# Patient Record
Sex: MALE | Race: BLACK OR AFRICAN AMERICAN | Employment: UNEMPLOYED | URBAN - METROPOLITAN AREA
[De-identification: names, ages, dates, MRNs, and addresses within clinical notes are randomized per-mention and may not be internally consistent; named-entity substitution may affect disease eponyms.]

---
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
## 2022-03-19 PROBLEM — M62.82 RHABDOMYOLYSIS: Status: ACTIVE | Noted: 2020-12-12

## 2022-03-19 PROBLEM — M62.830 SPASM OF BACK MUSCLES: Status: ACTIVE | Noted: 2020-12-12

## 2022-03-19 PROBLEM — R10.84 GENERALIZED ABDOMINAL PAIN: Status: ACTIVE | Noted: 2020-12-12

## 2024-09-12 PROBLEM — G40.901 STATUS EPILEPTICUS (HCC): Status: ACTIVE | Noted: 2024-09-12

## 2024-09-15 PROBLEM — N17.9 ARF (ACUTE RENAL FAILURE) (HCC): Status: ACTIVE | Noted: 2024-09-15

## 2024-11-23 ENCOUNTER — HOSPITAL ENCOUNTER (EMERGENCY)
Facility: HOSPITAL | Age: 24
Discharge: HOME | End: 2024-11-23
Attending: EMERGENCY MEDICINE
Payer: MEDICAID

## 2024-11-23 VITALS
HEART RATE: 73 BPM | RESPIRATION RATE: 18 BRPM | DIASTOLIC BLOOD PRESSURE: 73 MMHG | HEIGHT: 74 IN | SYSTOLIC BLOOD PRESSURE: 125 MMHG | TEMPERATURE: 98.9 F | BODY MASS INDEX: 19.25 KG/M2 | OXYGEN SATURATION: 97 % | WEIGHT: 150 LBS

## 2024-11-23 DIAGNOSIS — R56.9 SEIZURE (HCC): Primary | ICD-10-CM

## 2024-11-23 LAB
ALBUMIN SERPL-MCNC: 3.6 G/DL (ref 3.4–5)
ALBUMIN/GLOB SERPL: 1 (ref 0.8–1.7)
ALP SERPL-CCNC: 78 U/L (ref 45–117)
ALT SERPL-CCNC: 28 U/L (ref 16–61)
AMPHET UR QL SCN: NEGATIVE
ANION GAP SERPL CALC-SCNC: 2 MMOL/L (ref 3–18)
APPEARANCE UR: CLEAR
AST SERPL-CCNC: 22 U/L (ref 10–38)
BACTERIA URNS QL MICRO: ABNORMAL /HPF
BARBITURATES UR QL SCN: NEGATIVE
BASOPHILS # BLD: 0.1 K/UL (ref 0–0.1)
BASOPHILS NFR BLD: 1 % (ref 0–2)
BENZODIAZ UR QL: NEGATIVE
BILIRUB SERPL-MCNC: 0.3 MG/DL (ref 0.2–1)
BILIRUB UR QL: NEGATIVE
BUN SERPL-MCNC: 15 MG/DL (ref 7–18)
BUN/CREAT SERPL: 13 (ref 12–20)
CALCIUM SERPL-MCNC: 8.7 MG/DL (ref 8.5–10.1)
CANNABINOIDS UR QL SCN: POSITIVE
CHLORIDE SERPL-SCNC: 107 MMOL/L (ref 100–111)
CO2 SERPL-SCNC: 31 MMOL/L (ref 21–32)
COCAINE UR QL SCN: NEGATIVE
COLOR UR: YELLOW
CREAT SERPL-MCNC: 1.19 MG/DL (ref 0.6–1.3)
DIFFERENTIAL METHOD BLD: NORMAL
EOSINOPHIL # BLD: 0.1 K/UL (ref 0–0.4)
EOSINOPHIL NFR BLD: 2 % (ref 0–5)
EPITH CASTS URNS QL MICRO: ABNORMAL /LPF (ref 0–5)
ERYTHROCYTE [DISTWIDTH] IN BLOOD BY AUTOMATED COUNT: 12.6 % (ref 11.6–14.5)
ETHANOL SERPL-MCNC: <3 MG/DL (ref 0–3)
GLOBULIN SER CALC-MCNC: 3.5 G/DL (ref 2–4)
GLUCOSE SERPL-MCNC: 91 MG/DL (ref 74–99)
GLUCOSE UR STRIP.AUTO-MCNC: NEGATIVE MG/DL
HCT VFR BLD AUTO: 40.1 % (ref 36–48)
HGB BLD-MCNC: 13.1 G/DL (ref 13–16)
HGB UR QL STRIP: NEGATIVE
IMM GRANULOCYTES # BLD AUTO: 0 K/UL (ref 0–0.04)
IMM GRANULOCYTES NFR BLD AUTO: 0 % (ref 0–0.5)
KETONES UR QL STRIP.AUTO: NEGATIVE MG/DL
LEUKOCYTE ESTERASE UR QL STRIP.AUTO: NEGATIVE
LYMPHOCYTES # BLD: 3.2 K/UL (ref 0.9–3.6)
LYMPHOCYTES NFR BLD: 48 % (ref 21–52)
Lab: ABNORMAL
MAGNESIUM SERPL-MCNC: 1.9 MG/DL (ref 1.6–2.6)
MCH RBC QN AUTO: 29.4 PG (ref 24–34)
MCHC RBC AUTO-ENTMCNC: 32.7 G/DL (ref 31–37)
MCV RBC AUTO: 90.1 FL (ref 78–100)
METHADONE UR QL: NEGATIVE
MONOCYTES # BLD: 0.6 K/UL (ref 0.05–1.2)
MONOCYTES NFR BLD: 9 % (ref 3–10)
NEUTS SEG # BLD: 2.7 K/UL (ref 1.8–8)
NEUTS SEG NFR BLD: 40 % (ref 40–73)
NITRITE UR QL STRIP.AUTO: POSITIVE
NRBC # BLD: 0 K/UL (ref 0–0.01)
NRBC BLD-RTO: 0 PER 100 WBC
OPIATES UR QL: NEGATIVE
PCP UR QL: NEGATIVE
PH UR STRIP: 7.5 (ref 5–8)
PLATELET # BLD AUTO: 187 K/UL (ref 135–420)
PMV BLD AUTO: 9.2 FL (ref 9.2–11.8)
POTASSIUM SERPL-SCNC: 4.3 MMOL/L (ref 3.5–5.5)
PROT SERPL-MCNC: 7.1 G/DL (ref 6.4–8.2)
PROT UR STRIP-MCNC: NEGATIVE MG/DL
RBC # BLD AUTO: 4.45 M/UL (ref 4.35–5.65)
RBC #/AREA URNS HPF: ABNORMAL /HPF (ref 0–5)
SODIUM SERPL-SCNC: 140 MMOL/L (ref 136–145)
SP GR UR REFRACTOMETRY: <1.005 (ref 1–1.04)
UROBILINOGEN UR QL STRIP.AUTO: 0.2 EU/DL (ref 0.2–1)
VALPROATE SERPL-MCNC: 90 UG/ML (ref 50–100)
WBC # BLD AUTO: 6.7 K/UL (ref 4.6–13.2)
WBC URNS QL MICRO: ABNORMAL /HPF (ref 0–5)

## 2024-11-23 PROCEDURE — 83735 ASSAY OF MAGNESIUM: CPT

## 2024-11-23 PROCEDURE — 82077 ASSAY SPEC XCP UR&BREATH IA: CPT

## 2024-11-23 PROCEDURE — 80177 DRUG SCRN QUAN LEVETIRACETAM: CPT

## 2024-11-23 PROCEDURE — 85025 COMPLETE CBC W/AUTO DIFF WBC: CPT

## 2024-11-23 PROCEDURE — 80053 COMPREHEN METABOLIC PANEL: CPT

## 2024-11-23 PROCEDURE — 99284 EMERGENCY DEPT VISIT MOD MDM: CPT

## 2024-11-23 PROCEDURE — 96374 THER/PROPH/DIAG INJ IV PUSH: CPT

## 2024-11-23 PROCEDURE — 80307 DRUG TEST PRSMV CHEM ANLYZR: CPT

## 2024-11-23 PROCEDURE — 6360000002 HC RX W HCPCS: Performed by: HEALTH CARE PROVIDER

## 2024-11-23 PROCEDURE — 2580000003 HC RX 258: Performed by: EMERGENCY MEDICINE

## 2024-11-23 PROCEDURE — 81001 URINALYSIS AUTO W/SCOPE: CPT

## 2024-11-23 PROCEDURE — 80164 ASSAY DIPROPYLACETIC ACD TOT: CPT

## 2024-11-23 RX ORDER — LEVETIRACETAM 1000 MG/1
1000 TABLET ORAL 2 TIMES DAILY
Qty: 60 TABLET | Refills: 2 | Status: SHIPPED | OUTPATIENT
Start: 2024-11-23 | End: 2025-02-21

## 2024-11-23 RX ORDER — 0.9 % SODIUM CHLORIDE 0.9 %
1000 INTRAVENOUS SOLUTION INTRAVENOUS ONCE
Status: COMPLETED | OUTPATIENT
Start: 2024-11-23 | End: 2024-11-23

## 2024-11-23 RX ORDER — LEVETIRACETAM 500 MG/5ML
2000 INJECTION, SOLUTION, CONCENTRATE INTRAVENOUS
Status: COMPLETED | OUTPATIENT
Start: 2024-11-23 | End: 2024-11-23

## 2024-11-23 RX ADMIN — LEVETIRACETAM 2000 MG: 100 INJECTION INTRAVENOUS at 19:31

## 2024-11-23 RX ADMIN — SODIUM CHLORIDE 1000 ML: 9 INJECTION, SOLUTION INTRAVENOUS at 17:37

## 2024-11-23 ASSESSMENT — ENCOUNTER SYMPTOMS
CHEST TIGHTNESS: 0
COUGH: 0
ABDOMINAL PAIN: 0
SHORTNESS OF BREATH: 0
DIARRHEA: 0
VOMITING: 0
NAUSEA: 0

## 2024-11-23 ASSESSMENT — PAIN - FUNCTIONAL ASSESSMENT
PAIN_FUNCTIONAL_ASSESSMENT: ACTIVITIES ARE NOT PREVENTED
PAIN_FUNCTIONAL_ASSESSMENT: 0-10

## 2024-11-23 ASSESSMENT — PAIN SCALES - GENERAL: PAINLEVEL_OUTOF10: 0

## 2024-11-23 NOTE — ED TRIAGE NOTES
Pt arrived via EMS for reported seizure. Per EMS patient was in the car with his family and had a seizure witnessed by his family.  Pt has a history of seizure per EMS.  Blood sugar 115

## 2024-11-23 NOTE — ED PROVIDER NOTES
EMERGENCY DEPARTMENT HISTORY AND PHYSICAL EXAM        Date: 11/23/2024  Patient Name: Valdemar Figueroa    History of Presenting Illness     Chief Complaint   Patient presents with    Seizures       History Provided By: History obtained from patient    HPI: Valdemar Figueroa, 24 y.o. male presents to the ED with cc of seizure    Patient was in the car with his grandma today when she witnessed a seizure that spontaneously aborted.  She drove him to the emergency department for evaluation.  Patient reports compliance with his seizure medicine in general, he missed his medication dose last night but took it this morning.  But says that he continues to have breakthrough seizure, last seizure was 4 days ago.  Says that he has frequent breakthrough seizures and they usually do not come to the emergency department.  He does not have a neurologist right now due to insurance issues.      No nausea, vomiting, diarrhea, fever, chills, chest pain, shortness of breath, leg swelling     There are no other complaints, changes, or physical findings at this time.    Records Reviewed:   Medication list reviewed, shows keppra 1g bid     PCP: Ondina Baez MD    No current facility-administered medications on file prior to encounter.     Current Outpatient Medications on File Prior to Encounter   Medication Sig Dispense Refill    divalproex (DEPAKOTE ER) 250 MG extended release tablet Take 2 tablets by mouth in the morning and at bedtime 30 tablet 1    sertraline (ZOLOFT) 50 MG tablet Take 1 tablet by mouth daily      amphetamine-dextroamphetamine (ADDERALL) 5 MG tablet Take 15 mg by mouth. (Patient not taking: Reported on 9/12/2024)      levETIRAcetam (KEPPRA) 1000 MG tablet Take 1 tablet by mouth 2 times daily         Past History     Past Medical History:  Past Medical History:   Diagnosis Date    Asthma     Bipolar disorder (HCC)     IBS (irritable bowel syndrome)     Left against medical advice 09/12/2024    Marijuana abuse

## 2024-11-27 LAB — LEVETIRACETAM SERPL-MCNC: <2 UG/ML (ref 10–40)

## 2025-03-06 ENCOUNTER — HOSPITAL ENCOUNTER (OUTPATIENT)
Facility: HOSPITAL | Age: 25
Setting detail: SPECIMEN
Discharge: HOME | End: 2025-03-09

## 2025-03-06 ENCOUNTER — OFFICE VISIT (OUTPATIENT)
Age: 25
End: 2025-03-06
Payer: MEDICAID

## 2025-03-06 VITALS
DIASTOLIC BLOOD PRESSURE: 80 MMHG | RESPIRATION RATE: 18 BRPM | SYSTOLIC BLOOD PRESSURE: 124 MMHG | WEIGHT: 181.4 LBS | HEART RATE: 69 BPM | OXYGEN SATURATION: 98 % | BODY MASS INDEX: 23.28 KG/M2 | TEMPERATURE: 96.8 F | HEIGHT: 74 IN

## 2025-03-06 DIAGNOSIS — G40.909 SEIZURE DISORDER (HCC): Primary | ICD-10-CM

## 2025-03-06 DIAGNOSIS — G40.909 SEIZURE DISORDER (HCC): ICD-10-CM

## 2025-03-06 LAB — LABCORP SPECIMEN COLLECTION: NORMAL

## 2025-03-06 PROCEDURE — 99205 OFFICE O/P NEW HI 60 MIN: CPT | Performed by: STUDENT IN AN ORGANIZED HEALTH CARE EDUCATION/TRAINING PROGRAM

## 2025-03-06 PROCEDURE — 99001 SPECIMEN HANDLING PT-LAB: CPT

## 2025-03-06 RX ORDER — DIVALPROEX SODIUM 500 MG/1
TABLET, FILM COATED, EXTENDED RELEASE ORAL
Qty: 90 TABLET | Refills: 2 | Status: SHIPPED | OUTPATIENT
Start: 2025-03-06

## 2025-03-06 ASSESSMENT — ENCOUNTER SYMPTOMS
SHORTNESS OF BREATH: 0
VOMITING: 0
BACK PAIN: 0
COUGH: 1
NAUSEA: 0

## 2025-03-06 NOTE — PROGRESS NOTES
Valdemar Figueroa is a 24 y.o. male .presents for New Patient  A 24 years old male patient referred here for evaluation of seizure.  He came today with his mother who assisted with the history.  Patient is currently on Keppra 2000 mg p.o. twice daily and Depakote 500 mg p.o. twice daily.  He has been having seizures since the age of 19.  Seizures are mostly nocturnal.  Mother described generalized tonic-clonic seizure; foaming from his mouth; might bite his tongue.  No incontinence.  Might last for about 1 minute.  Postictally, gets confused for about 1 minute.  Occasionally, wakes up on the floor.  He currently has 1-2 generalized tonic-clonic seizure per month.  Also described another spell where he freezes, might fall, does not respond; little bit of shaking.  He currently has these spells 2 times a day.  Mother showed me videos of the 2 types of seizures.  During the generalized tonic-clonic seizure, patient was on the side, arms stretched out, appears stiff, had tonic-clonic jerking.  Was not responding.  During the other spell, patient appears confused, mother guiding him to the ground, not responding to her.  Last spell was today outside of the clinic.  Patient was admitted to [facility] in September 2024.  He had CT scan of the brain which was unremarkable. Had an EEG monitoring for 24 hours: intermittent frequent rhythmic delta activity, mainly in the frontal leads.   Patient used to follow with neurology previously; mother does not  remember the place; possibly has CHKD.  Was reported that he had possible nonepileptic seizures.  Patient was seen at [facility] emergency room in November 2024 for seizure.  Keppra level was undetectable; Depakote level was in therapeutic range.  Mother states that patient is taking his medications properly.      Review of Systems   Constitutional:  Negative for chills, fever and unexpected weight change.   HENT:  Negative for hearing loss and tinnitus.    Eyes:

## 2025-03-07 LAB
LEVETIRACETAM SERPL-MCNC: 32.5 UG/ML (ref 10–40)
VALPROATE SERPL-MCNC: 54 UG/ML (ref 50–100)

## 2025-03-24 ENCOUNTER — TELEPHONE (OUTPATIENT)
Age: 25
End: 2025-03-24

## 2025-03-24 NOTE — TELEPHONE ENCOUNTER
Patient mom called stating they have not heard from anyone at [facility], says he did have a bad seizure yesterday & today .

## 2025-04-10 ENCOUNTER — TELEPHONE (OUTPATIENT)
Age: 25
End: 2025-04-10

## 2025-04-10 NOTE — TELEPHONE ENCOUNTER
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

## 2025-04-24 ENCOUNTER — TELEPHONE (OUTPATIENT)
Age: 25
End: 2025-04-24

## 2025-04-24 NOTE — TELEPHONE ENCOUNTER
Called pcp - they are faxing labs to [phone]   Patient mom called regarding MRI needing peer to peer . Says if she dont answer leave vm

## 2025-04-29 ENCOUNTER — TELEPHONE (OUTPATIENT)
Age: 25
End: 2025-04-29

## 2025-04-29 DIAGNOSIS — E23.7 PITUITARY LESION: Primary | ICD-10-CM

## 2025-04-29 DIAGNOSIS — E23.7 PITUITARY LESION: ICD-10-CM

## 2025-04-29 NOTE — TELEPHONE ENCOUNTER
Patient's mother called in stating patient had grand mal seizure and was waiting for provider to do peer to peer to authorize a second MRI due to a spot being found on his brain. Advised we received a denial but patient's mother wants to know if he did the peer to peer, as per the insurance they would cover it if provider does peer to peer. Please advise.

## 2025-04-29 NOTE — TELEPHONE ENCOUNTER
Spoke to patient's mother and advised that labs need to be completed. She was told that the office will send a copy of the order to their home as she stated it would be hard to get to a [facility] facility to get the lab work completed.

## 2025-05-16 ENCOUNTER — OFFICE VISIT (OUTPATIENT)
Age: 25
End: 2025-05-16

## 2025-05-16 ENCOUNTER — HOSPITAL ENCOUNTER (OUTPATIENT)
Age: 25
Discharge: HOME | End: 2025-05-19

## 2025-05-16 VITALS
HEART RATE: 73 BPM | TEMPERATURE: 97.8 F | SYSTOLIC BLOOD PRESSURE: 96 MMHG | OXYGEN SATURATION: 98 % | RESPIRATION RATE: 18 BRPM | DIASTOLIC BLOOD PRESSURE: 70 MMHG | BODY MASS INDEX: 21.57 KG/M2 | HEIGHT: 74 IN | WEIGHT: 168.1 LBS

## 2025-05-16 DIAGNOSIS — G40.909 SEIZURE DISORDER (HCC): ICD-10-CM

## 2025-05-16 LAB — LABCORP SPECIMEN COLLECTION: NORMAL

## 2025-05-16 RX ORDER — LEVETIRACETAM 1000 MG/1
1000 TABLET ORAL 2 TIMES DAILY
Qty: 60 TABLET | Refills: 2 | Status: SHIPPED | OUTPATIENT
Start: 2025-05-16 | End: 2025-08-14

## 2025-05-16 RX ORDER — QUETIAPINE 50 MG/1
50 TABLET, EXTENDED RELEASE ORAL NIGHTLY
COMMUNITY
Start: 2025-04-14

## 2025-05-16 RX ORDER — DIVALPROEX SODIUM 500 MG/1
TABLET, FILM COATED, EXTENDED RELEASE ORAL
Qty: 90 TABLET | Refills: 2 | Status: SHIPPED | OUTPATIENT
Start: 2025-05-16

## 2025-05-16 ASSESSMENT — ENCOUNTER SYMPTOMS
BACK PAIN: 0
SHORTNESS OF BREATH: 1
DIARRHEA: 1
NAUSEA: 0
VOMITING: 0
COUGH: 0

## 2025-05-16 NOTE — PROGRESS NOTES
Valdemar Figueroa is a 24 y.o. male .presents for Follow-up  A 22 years old male patient here for follow-up of seizure.  Last seen in the clinic in March 2025.  He is on Depakote 500 g a morning and 1000 mg at night and Keppra 1000 mg p.o. twice daily.  He came today with his mother.  His mother mentions that he had a couple of seizures since the last visit; last seizure about 2 days ago.  Mother mentioned back-to-back seizures on May 11, 2025: Spaced out and then started to shake all over; no tongue bite or incontinence.  Had headache postictally.  Was confused.  No Henrry's paralysis.  Taking his medications properly.  Levels of both medications from March were in therapeutic range.  Scheduled for EMU on June 9 at [facility].    Initial encounter:  A 24 years old male patient referred here for evaluation of seizure.  He came today with his mother who assisted with the history.  Patient is currently on Keppra 2000 mg p.o. twice daily and Depakote 500 mg p.o. twice daily.  He has been having seizures since the age of 19.  Seizures are mostly nocturnal.  Mother described generalized tonic-clonic seizure; foaming from his mouth; might bite his tongue.  No incontinence.  Might last for about 1 minute.  Postictally, gets confused for about 1 minute.  Occasionally, wakes up on the floor.  He currently has 1-2 generalized tonic-clonic seizure per month.  Also described another spell where he freezes, might fall, does not respond; little bit of shaking.  He currently has these spells 2 times a day.  Mother showed me videos of the 2 types of seizures.  During the generalized tonic-clonic seizure, patient was on the side, arms stretched out, appears stiff, had tonic-clonic jerking.  Was not responding.  During the other spell, patient appears confused, mother guiding him to the ground, not responding to her.  Last spell was today outside of the clinic.  Patient was admitted to [facility] in September 2024.

## 2025-05-17 LAB
PROLACTIN SERPL-MCNC: 15.5 NG/ML (ref 3.6–31.5)
SPECIMEN STATUS REPORT: NORMAL
TSH SERPL DL<=0.005 MIU/L-ACNC: 1.97 UIU/ML (ref 0.45–4.5)

## 2025-05-18 LAB — ACTH PLAS-MCNC: 38.7 PG/ML (ref 7.2–63.3)

## 2025-05-20 LAB — IGF-I SERPL-MCNC: 167 NG/ML (ref 109–353)

## 2025-06-09 ENCOUNTER — HOSPITAL ENCOUNTER (OUTPATIENT)
Facility: HOSPITAL | Age: 25
Setting detail: OBSERVATION
Discharge: HOME | End: 2025-06-11
Attending: PSYCHIATRY & NEUROLOGY | Admitting: PSYCHIATRY & NEUROLOGY
Payer: MEDICAID

## 2025-06-09 PROBLEM — G40.919 INTRACTABLE SEIZURES (HCC): Status: ACTIVE | Noted: 2025-06-09

## 2025-06-09 LAB
ALBUMIN SERPL-MCNC: 3.3 G/DL (ref 3.5–5)
ALBUMIN/GLOB SERPL: 1.1 (ref 1.1–2.2)
ALP SERPL-CCNC: 56 U/L (ref 45–117)
ALT SERPL-CCNC: 19 U/L (ref 12–78)
AMPHET UR QL SCN: NEGATIVE
ANION GAP SERPL CALC-SCNC: 4 MMOL/L (ref 2–12)
AST SERPL-CCNC: 15 U/L (ref 15–37)
BARBITURATES UR QL SCN: NEGATIVE
BASOPHILS # BLD: 0.03 K/UL (ref 0–0.1)
BASOPHILS NFR BLD: 0.4 % (ref 0–1)
BENZODIAZ UR QL: NEGATIVE
BILIRUB SERPL-MCNC: 0.5 MG/DL (ref 0.2–1)
BUN SERPL-MCNC: 10 MG/DL (ref 6–20)
BUN/CREAT SERPL: 11 (ref 12–20)
CALCIUM SERPL-MCNC: 8.7 MG/DL (ref 8.5–10.1)
CANNABINOIDS UR QL SCN: POSITIVE
CHLORIDE SERPL-SCNC: 108 MMOL/L (ref 97–108)
CO2 SERPL-SCNC: 29 MMOL/L (ref 21–32)
COCAINE UR QL SCN: NEGATIVE
COMMENT:: NORMAL
CREAT SERPL-MCNC: 0.95 MG/DL (ref 0.7–1.3)
DIFFERENTIAL METHOD BLD: ABNORMAL
EOSINOPHIL # BLD: 0.11 K/UL (ref 0–0.4)
EOSINOPHIL NFR BLD: 1.6 % (ref 0–7)
ERYTHROCYTE [DISTWIDTH] IN BLOOD BY AUTOMATED COUNT: 12.6 % (ref 11.5–14.5)
GLOBULIN SER CALC-MCNC: 3.1 G/DL (ref 2–4)
GLUCOSE SERPL-MCNC: 124 MG/DL (ref 65–100)
HCT VFR BLD AUTO: 36.7 % (ref 36.6–50.3)
HGB BLD-MCNC: 12.2 G/DL (ref 12.1–17)
IMM GRANULOCYTES # BLD AUTO: 0.01 K/UL (ref 0–0.04)
IMM GRANULOCYTES NFR BLD AUTO: 0.1 % (ref 0–0.5)
LYMPHOCYTES # BLD: 2.4 K/UL (ref 0.8–3.5)
LYMPHOCYTES NFR BLD: 34.8 % (ref 12–49)
Lab: ABNORMAL
MCH RBC QN AUTO: 30.6 PG (ref 26–34)
MCHC RBC AUTO-ENTMCNC: 33.2 G/DL (ref 30–36.5)
MCV RBC AUTO: 92 FL (ref 80–99)
METHADONE UR QL: NEGATIVE
MONOCYTES # BLD: 0.83 K/UL (ref 0–1)
MONOCYTES NFR BLD: 12 % (ref 5–13)
NEUTS SEG # BLD: 3.52 K/UL (ref 1.8–8)
NEUTS SEG NFR BLD: 51.1 % (ref 32–75)
NRBC # BLD: 0 K/UL (ref 0–0.01)
NRBC BLD-RTO: 0 PER 100 WBC
OPIATES UR QL: NEGATIVE
PCP UR QL: NEGATIVE
PLATELET # BLD AUTO: 171 K/UL (ref 150–400)
PMV BLD AUTO: 9.5 FL (ref 8.9–12.9)
POTASSIUM SERPL-SCNC: 3.6 MMOL/L (ref 3.5–5.1)
PROT SERPL-MCNC: 6.4 G/DL (ref 6.4–8.2)
RBC # BLD AUTO: 3.99 M/UL (ref 4.1–5.7)
SODIUM SERPL-SCNC: 141 MMOL/L (ref 136–145)
SPECIMEN HOLD: NORMAL
SPECIMEN HOLD: NORMAL
VALPROATE SERPL-MCNC: 82 UG/ML (ref 50–100)
WBC # BLD AUTO: 6.9 K/UL (ref 4.1–11.1)

## 2025-06-09 PROCEDURE — 36415 COLL VENOUS BLD VENIPUNCTURE: CPT

## 2025-06-09 PROCEDURE — 99223 1ST HOSP IP/OBS HIGH 75: CPT | Performed by: PSYCHIATRY & NEUROLOGY

## 2025-06-09 PROCEDURE — 85025 COMPLETE CBC W/AUTO DIFF WBC: CPT

## 2025-06-09 PROCEDURE — G0378 HOSPITAL OBSERVATION PER HR: HCPCS

## 2025-06-09 PROCEDURE — 96372 THER/PROPH/DIAG INJ SC/IM: CPT

## 2025-06-09 PROCEDURE — 2500000003 HC RX 250 WO HCPCS: Performed by: PSYCHIATRY & NEUROLOGY

## 2025-06-09 PROCEDURE — 80307 DRUG TEST PRSMV CHEM ANLYZR: CPT

## 2025-06-09 PROCEDURE — 6370000000 HC RX 637 (ALT 250 FOR IP): Performed by: PSYCHIATRY & NEUROLOGY

## 2025-06-09 PROCEDURE — 6360000002 HC RX W HCPCS: Performed by: PSYCHIATRY & NEUROLOGY

## 2025-06-09 PROCEDURE — G0379 DIRECT REFER HOSPITAL OBSERV: HCPCS

## 2025-06-09 PROCEDURE — 80053 COMPREHEN METABOLIC PANEL: CPT

## 2025-06-09 PROCEDURE — 80177 DRUG SCRN QUAN LEVETIRACETAM: CPT

## 2025-06-09 PROCEDURE — 80164 ASSAY DIPROPYLACETIC ACD TOT: CPT

## 2025-06-09 RX ORDER — LORAZEPAM 2 MG/ML
2 INJECTION INTRAMUSCULAR SEE ADMIN INSTRUCTIONS
Status: DISCONTINUED | OUTPATIENT
Start: 2025-06-09 | End: 2025-06-11 | Stop reason: HOSPADM

## 2025-06-09 RX ORDER — SODIUM CHLORIDE 0.9 % (FLUSH) 0.9 %
5-40 SYRINGE (ML) INJECTION EVERY 12 HOURS SCHEDULED
Status: DISCONTINUED | OUTPATIENT
Start: 2025-06-09 | End: 2025-06-11 | Stop reason: HOSPADM

## 2025-06-09 RX ORDER — ONDANSETRON 2 MG/ML
4 INJECTION INTRAMUSCULAR; INTRAVENOUS EVERY 6 HOURS PRN
Status: DISCONTINUED | OUTPATIENT
Start: 2025-06-09 | End: 2025-06-11 | Stop reason: HOSPADM

## 2025-06-09 RX ORDER — LEVETIRACETAM 500 MG/1
1000 TABLET ORAL 2 TIMES DAILY
Status: DISCONTINUED | OUTPATIENT
Start: 2025-06-09 | End: 2025-06-11 | Stop reason: HOSPADM

## 2025-06-09 RX ORDER — QUETIAPINE FUMARATE 50 MG/1
50 TABLET, EXTENDED RELEASE ORAL DAILY
Status: DISCONTINUED | OUTPATIENT
Start: 2025-06-10 | End: 2025-06-10

## 2025-06-09 RX ORDER — ACETAMINOPHEN 650 MG/1
650 SUPPOSITORY RECTAL EVERY 6 HOURS PRN
Status: DISCONTINUED | OUTPATIENT
Start: 2025-06-09 | End: 2025-06-11 | Stop reason: HOSPADM

## 2025-06-09 RX ORDER — ACETAMINOPHEN 325 MG/1
650 TABLET ORAL EVERY 6 HOURS PRN
Status: DISCONTINUED | OUTPATIENT
Start: 2025-06-09 | End: 2025-06-11 | Stop reason: HOSPADM

## 2025-06-09 RX ORDER — ONDANSETRON 4 MG/1
4 TABLET, ORALLY DISINTEGRATING ORAL EVERY 8 HOURS PRN
Status: DISCONTINUED | OUTPATIENT
Start: 2025-06-09 | End: 2025-06-11 | Stop reason: HOSPADM

## 2025-06-09 RX ORDER — SODIUM CHLORIDE 9 MG/ML
INJECTION, SOLUTION INTRAVENOUS PRN
Status: DISCONTINUED | OUTPATIENT
Start: 2025-06-09 | End: 2025-06-11 | Stop reason: HOSPADM

## 2025-06-09 RX ORDER — DIVALPROEX SODIUM 250 MG/1
1000 TABLET, FILM COATED, EXTENDED RELEASE ORAL
Status: DISCONTINUED | OUTPATIENT
Start: 2025-06-09 | End: 2025-06-11

## 2025-06-09 RX ORDER — DIVALPROEX SODIUM 250 MG/1
500 TABLET, FILM COATED, EXTENDED RELEASE ORAL DAILY
Status: DISCONTINUED | OUTPATIENT
Start: 2025-06-10 | End: 2025-06-11

## 2025-06-09 RX ORDER — ENOXAPARIN SODIUM 100 MG/ML
40 INJECTION SUBCUTANEOUS DAILY
Status: DISCONTINUED | OUTPATIENT
Start: 2025-06-09 | End: 2025-06-11 | Stop reason: HOSPADM

## 2025-06-09 RX ORDER — SODIUM CHLORIDE 0.9 % (FLUSH) 0.9 %
5-40 SYRINGE (ML) INJECTION PRN
Status: DISCONTINUED | OUTPATIENT
Start: 2025-06-09 | End: 2025-06-11 | Stop reason: HOSPADM

## 2025-06-09 RX ORDER — POLYETHYLENE GLYCOL 3350 17 G/17G
17 POWDER, FOR SOLUTION ORAL DAILY PRN
Status: DISCONTINUED | OUTPATIENT
Start: 2025-06-09 | End: 2025-06-11 | Stop reason: HOSPADM

## 2025-06-09 RX ADMIN — DIVALPROEX SODIUM 1000 MG: 250 TABLET, FILM COATED, EXTENDED RELEASE ORAL at 22:37

## 2025-06-09 RX ADMIN — ENOXAPARIN SODIUM 40 MG: 100 INJECTION SUBCUTANEOUS at 13:24

## 2025-06-09 RX ADMIN — LEVETIRACETAM 1000 MG: 500 TABLET, FILM COATED ORAL at 22:37

## 2025-06-09 RX ADMIN — SODIUM CHLORIDE, PRESERVATIVE FREE 10 ML: 5 INJECTION INTRAVENOUS at 11:29

## 2025-06-09 RX ADMIN — SODIUM CHLORIDE, PRESERVATIVE FREE 10 ML: 5 INJECTION INTRAVENOUS at 22:40

## 2025-06-09 NOTE — H&P
EMU Admission H&P     NAME: Valdemar Figueroa   :  2000   MRN:  [id number]   DATE:   2025       HPI:  Pt is a 25yo male referred to the EMU by Dr. Razo due to ongoing breakthrough seizures.  Patient is seen with his mother at the bedside who aids greatly in the history.  Patient started having seizures the day after his 19th birthday.  Initially many of them were nocturnal, but it seems they can occur at any time now.  They have difficulty really telling me from start to finish what his seizures like.  He has at least 2 types of spells, the first is staring lasting 30-60 seconds and the second is generalized shaking.  The patient states he may have a warning of hearing a song he has heard before.  He is told his mom's body hurts, he has a pain in his buttocks, and states \"I cannot move.\"  He then will freeze and stare and then starts shaking.  With a big 1 he will moan and shake for 45 to 60 seconds others are shorter 30 to 45 seconds.  He has loss of awareness but no loss of consciousness, he is confused after and may complain of a headache and has bitten his tongue.  He has never had bowel or bladder incontinence.  After a spell he may burp or fart and that when it has ended.  If he is standing he has fallen with them.  He was started on Keppra currently on 1000 mg twice daily and on Depakote /1000mg.  He has seizure risk factor of father and paternal half brother with epilepsy, no history of head injury with LOC, no CNS infection, no history of febrile seizure, mom had a normal pregnancy and delivery with him.  He also routinely smokes marijuana.  There is been a question of nonepileptic spells raised because around the same time he was diagnosed with epilepsy he was diagnosed with bipolar disorder.  He had an abnormal 24-hour EEG 2024 which

## 2025-06-09 NOTE — PROGRESS NOTES
Attempted to deliver and verbally explain the VOON with patient. Patient was with clinical staff. Rosina Connor, Care Management Assistant

## 2025-06-10 PROCEDURE — 6370000000 HC RX 637 (ALT 250 FOR IP): Performed by: PSYCHIATRY & NEUROLOGY

## 2025-06-10 PROCEDURE — 99232 SBSQ HOSP IP/OBS MODERATE 35: CPT | Performed by: PSYCHIATRY & NEUROLOGY

## 2025-06-10 PROCEDURE — 96372 THER/PROPH/DIAG INJ SC/IM: CPT

## 2025-06-10 PROCEDURE — 95716 VEEG EA 12-26HR CONT MNTR: CPT

## 2025-06-10 PROCEDURE — 96374 THER/PROPH/DIAG INJ IV PUSH: CPT

## 2025-06-10 PROCEDURE — 6360000002 HC RX W HCPCS: Performed by: PSYCHIATRY & NEUROLOGY

## 2025-06-10 PROCEDURE — 2500000003 HC RX 250 WO HCPCS: Performed by: PSYCHIATRY & NEUROLOGY

## 2025-06-10 PROCEDURE — G0378 HOSPITAL OBSERVATION PER HR: HCPCS

## 2025-06-10 RX ORDER — QUETIAPINE FUMARATE 50 MG/1
50 TABLET, EXTENDED RELEASE ORAL NIGHTLY
Status: DISCONTINUED | OUTPATIENT
Start: 2025-06-11 | End: 2025-06-11 | Stop reason: HOSPADM

## 2025-06-10 RX ADMIN — QUETIAPINE FUMARATE 50 MG: 50 TABLET, EXTENDED RELEASE ORAL at 09:03

## 2025-06-10 RX ADMIN — ONDANSETRON 4 MG: 2 INJECTION, SOLUTION INTRAMUSCULAR; INTRAVENOUS at 10:45

## 2025-06-10 RX ADMIN — DIVALPROEX SODIUM 500 MG: 250 TABLET, FILM COATED, EXTENDED RELEASE ORAL at 09:19

## 2025-06-10 RX ADMIN — SODIUM CHLORIDE, PRESERVATIVE FREE 10 ML: 5 INJECTION INTRAVENOUS at 09:09

## 2025-06-10 RX ADMIN — ENOXAPARIN SODIUM 40 MG: 100 INJECTION SUBCUTANEOUS at 09:09

## 2025-06-10 RX ADMIN — SODIUM CHLORIDE, PRESERVATIVE FREE 10 ML: 5 INJECTION INTRAVENOUS at 22:56

## 2025-06-10 RX ADMIN — DIVALPROEX SODIUM 1000 MG: 250 TABLET, FILM COATED, EXTENDED RELEASE ORAL at 22:54

## 2025-06-10 RX ADMIN — SERTRALINE HYDROCHLORIDE 50 MG: 50 TABLET ORAL at 09:03

## 2025-06-10 RX ADMIN — LEVETIRACETAM 1000 MG: 500 TABLET, FILM COATED ORAL at 22:54

## 2025-06-10 RX ADMIN — LEVETIRACETAM 1000 MG: 500 TABLET, FILM COATED ORAL at 09:03

## 2025-06-10 NOTE — PROCEDURES
[facility]   EMU REPORT    PROCEDURE:  CONTINUOUS VIDEO EEG MONITORING  NAME:   Valdemar Figueroa  :   2000  ACCOUNT NUMBER : [id number]  MRN:   [id number]  DATE OF SERVICE: 25       HISTORY OF PRESENT ILLNESS:  Briefly, ***     MEDICATIONS:   Current Facility-Administered Medications   Medication Dose Route Frequency Provider Last Rate Last Admin    sodium chloride flush 0.9 % injection 5-40 mL  5-40 mL IntraVENous 2 times per day Mary Rosario MD   10 mL at 06/10/25 0909    sodium chloride flush 0.9 % injection 5-40 mL  5-40 mL IntraVENous PRN Mary Rosario MD        0.9 % sodium chloride infusion   IntraVENous PRN Mary Rosario MD        enoxaparin (LOVENOX) injection 40 mg  40 mg SubCUTAneous Daily Mary Rosario MD   40 mg at 06/10/25 0909    ondansetron (ZOFRAN-ODT) disintegrating tablet 4 mg  4 mg Oral Q8H PRN Mary Rosario MD        Or    ondansetron (ZOFRAN) injection 4 mg  4 mg IntraVENous Q6H PRN Mary Rosario MD        polyethylene glycol (GLYCOLAX) packet 17 g  17 g Oral Daily PRN Mary Rosario MD        acetaminophen (TYLENOL) tablet 650 mg  650 mg Oral Q6H PRN Mary Rosario MD        Or    acetaminophen (TYLENOL) suppository 650 mg  650 mg Rectal Q6H PRN Mary Rosario MD        LORazepam (ATIVAN) injection 2 mg  2 mg IntraVENous See Admin Instructions Mary Rosario MD        LORazepam (ATIVAN) injection 2 mg  2 mg IntraMUSCular See Admin Instructions Mary Rosario MD        fosphenytoin (CEREBYX) 1,525 mg PE in sodium chloride 0.9 % 100 mL IVPB (loading dose)  20 mg PE/kg (Order-Specific) IntraVENous Once PRN Mary Rosario MD        divalproex (DEPAKOTE ER) extended release tablet 500 mg  500 mg Oral Daily Mary Rosario MD   500 mg at 06/10/25 0919    divalproex (DEPAKOTE ER) extended release tablet 1,000 mg  1,000 mg Oral QHS Mary Rosario MD   1,000 mg at 25 [id number]    levETIRAcetam (KEPPRA) tablet 1,000 mg  1,000 mg Oral BID Mabie,

## 2025-06-10 NOTE — PROGRESS NOTES
At approx. 0509 monitor tech alarmed for seizure like activity.     Upon entry monitor tech reported pt making coughing/choking sounds. Pt was uncooperative and stated feeling 'hot and scared.\" RN attempted vitals and LOC. Pt was uncooperative and unable to get BP due to pt moving around in bed. Pt was attempting to grab covers and pull over head.     Pt became mute and staring in distance. Asked pt name and  unable to reply.     This lasted approximately 1-2 minutes.     RN & monitor tech reattempts Name and  and pt is able to respond successfully. Seizure Testing Protocol worksheet is used to assess patient. Pt asked to remember \"football player.\" Pt was able to point both hands to the ceiling. Pt reported feeling 5/10. Pt repeated \"I heard him speak on the radio last night.\" Pt was able to show 2 fingers.pt was unable to remember phrase\" football player.\"    Pt burped and pt's mother reported that is the usual end of seizure like activity. Pt grabbed phone and starting typing.     RN & monitor tech repeated Seizure Testing Protocol worksheet. Pt was able to lift both arms and hold for 5 seconds. When rested back down pt's L arm was shaking when holding phone. Pt stated they were unable to remember anything. Pt's mother asked who the patient was texting at 5 in the morning. Pt replied \"I was not texting anyone.\" RN witnessed a message was sent on phone. Asked pt to repeat the phrase \"I heard him speak on the radio last night.\" Pt replied \"I already said that\". Asked pt to repeat again. Pt was able to repeat all of phrase except \"last night.\" Pt was able to name 2 objects in the room.    This process was completed by approx. 0517.

## 2025-06-11 VITALS
BODY MASS INDEX: 21.2 KG/M2 | SYSTOLIC BLOOD PRESSURE: 100 MMHG | TEMPERATURE: 98.1 F | DIASTOLIC BLOOD PRESSURE: 51 MMHG | OXYGEN SATURATION: 98 % | WEIGHT: 165.12 LBS | RESPIRATION RATE: 18 BRPM | HEART RATE: 72 BPM

## 2025-06-11 LAB — LEVETIRACETAM SERPL-MCNC: 33.4 UG/ML (ref 10–40)

## 2025-06-11 PROCEDURE — 6370000000 HC RX 637 (ALT 250 FOR IP): Performed by: PSYCHIATRY & NEUROLOGY

## 2025-06-11 PROCEDURE — 2500000003 HC RX 250 WO HCPCS: Performed by: PSYCHIATRY & NEUROLOGY

## 2025-06-11 PROCEDURE — 95716 VEEG EA 12-26HR CONT MNTR: CPT

## 2025-06-11 PROCEDURE — G0378 HOSPITAL OBSERVATION PER HR: HCPCS

## 2025-06-11 PROCEDURE — 6360000002 HC RX W HCPCS: Performed by: PSYCHIATRY & NEUROLOGY

## 2025-06-11 PROCEDURE — 95713 VEEG 2-12 HR CONT MNTR: CPT

## 2025-06-11 PROCEDURE — 96372 THER/PROPH/DIAG INJ SC/IM: CPT

## 2025-06-11 RX ORDER — DIVALPROEX SODIUM 500 MG/1
1000 TABLET, FILM COATED, EXTENDED RELEASE ORAL 2 TIMES DAILY
Qty: 120 TABLET | Refills: 3 | Status: SHIPPED | OUTPATIENT
Start: 2025-06-11

## 2025-06-11 RX ORDER — DIVALPROEX SODIUM 250 MG/1
1000 TABLET, FILM COATED, EXTENDED RELEASE ORAL 2 TIMES DAILY
Status: DISCONTINUED | OUTPATIENT
Start: 2025-06-11 | End: 2025-06-11 | Stop reason: HOSPADM

## 2025-06-11 RX ADMIN — LEVETIRACETAM 1000 MG: 500 TABLET, FILM COATED ORAL at 10:08

## 2025-06-11 RX ADMIN — DIVALPROEX SODIUM 500 MG: 250 TABLET, FILM COATED, EXTENDED RELEASE ORAL at 10:08

## 2025-06-11 RX ADMIN — ENOXAPARIN SODIUM 40 MG: 100 INJECTION SUBCUTANEOUS at 10:08

## 2025-06-11 RX ADMIN — SODIUM CHLORIDE, PRESERVATIVE FREE 10 ML: 5 INJECTION INTRAVENOUS at 10:09

## 2025-06-11 NOTE — PROGRESS NOTES
Neurology Progress Note     NAME: Valdemar Figueroa   :  2000   MRN:  [id number]   DATE:  6/10/2025    Assessment:     Principal Problem:    Intractable seizures (HCC)  Resolved Problems:    * No resolved hospital problems. *    Pt is a 25yo male with onset of seizures at age 20yo. He has at least 2 types of spells, the first is staring lasting 30-60 seconds and the second is generalized shaking.  May have a warning of hearing a song he has heard before, will tell his mom's body hurts, he has a pain in his buttocks, and states \"I cannot move.\"  He then will freeze and stare and then starts shaking, may last 30-60 seconds.  +DAMARIS, no LOC, confused after and may complain of a headache, + tongue trauma.  No b/b incontinence.  After a spell he may burp or fart and that when it has ended. Currrently on Keppra 1000 mg bid and Depakote /1000mg.  Seizure risk factor of father and paternal half brother with epilepsy, and routinely smokes marijuana.  There is been a question of nonepileptic spells raised. 24-hour EEG 2024 with intermittent frequent rhythmic delta activity mainly in the frontal leads and  MRI brain w/wo contrast 2025 without epileptogenic focus.    Exam - A&O, non-focal.   VA level 82. CMP and CBC unremarkable. UDS +THC.     Pt had two spells overnight and TNTC bilateral independent temporal sharp waves. Details to follow in EEG report.   Plan:   -Continue video EEG monitoring  -Continue Depakote /1000mg  -Continue Keppra 1000mg bid    Subjective:   Pt is sleepy today. Was afraid to go to sleep.     Objective:   Chart reviewed since last seen    Current Facility-Administered Medications   Medication Dose Route Frequency    [START ON 2025] QUEtiapine (SEROQUEL XR) extended release tablet 50 mg  50 mg Oral Nightly    [START ON 2025]

## 2025-06-11 NOTE — DISCHARGE INSTRUCTIONS
Continue all of your home medication as you have been taking them except the Depakote ER 500mg tabs.  Increase Depakote ER 500mg tabs to 2 tabs twice a day.   Continue to refrain from driving.  Recommend referral to an Epilepsy surgical center such as [facility].

## 2025-06-11 NOTE — PLAN OF CARE
Problem: Discharge Planning  Goal: Discharge to home or other facility with appropriate resources  6/9/2025 2333 by Andira Lam RN  Outcome: Progressing  6/9/2025 1200 by Jenni Abdullahi RN  Outcome: Progressing  Flowsheets (Taken 6/9/2025 1115)  Discharge to home or other facility with appropriate resources: Identify barriers to discharge with patient and caregiver     Problem: Safety - Adult  Goal: Free from fall injury  6/9/2025 2333 by Andria Lam RN  Outcome: Progressing  6/9/2025 1200 by Jenni Abdullahi RN  Outcome: Progressing     Problem: Chronic Conditions and Co-morbidities  Goal: Patient's chronic conditions and co-morbidity symptoms are monitored and maintained or improved  6/9/2025 2333 by Andria Lam RN  Outcome: Progressing  6/9/2025 1200 by Jenni Abdullahi RN  Outcome: Progressing     
  Problem: Discharge Planning  Goal: Discharge to home or other facility with appropriate resources  Outcome: Progressing     Problem: Safety - Adult  Goal: Free from fall injury  Outcome: Progressing     Problem: Chronic Conditions and Co-morbidities  Goal: Patient's chronic conditions and co-morbidity symptoms are monitored and maintained or improved  Outcome: Progressing     Problem: Seizure Precautions  Goal: Remains free of injury related to seizures activity  Outcome: Progressing     
  Problem: Discharge Planning  Goal: Discharge to home or other facility with appropriate resources  Outcome: Progressing  Flowsheets (Taken 6/9/2025 1115)  Discharge to home or other facility with appropriate resources: Identify barriers to discharge with patient and caregiver     Problem: Safety - Adult  Goal: Free from fall injury  Outcome: Progressing     Problem: Chronic Conditions and Co-morbidities  Goal: Patient's chronic conditions and co-morbidity symptoms are monitored and maintained or improved  Outcome: Progressing     
are exacerbated and prevent overall improvement and discharge     Problem: Seizure Precautions  Goal: Remains free of injury related to seizures activity  Outcome: Progressing     
symptoms are monitored and maintained or improved  6/10/2025 1243 by Amanda Mclain, RN  Outcome: Progressing  Flowsheets (Taken 6/10/2025 0800)  Care Plan - Patient's Chronic Conditions and Co-Morbidity Symptoms are Monitored and Maintained or Improved:   Monitor and assess patient's chronic conditions and comorbid symptoms for stability, deterioration, or improvement   Collaborate with multidisciplinary team to address chronic and comorbid conditions and prevent exacerbation or deterioration   Update acute care plan with appropriate goals if chronic or comorbid symptoms are exacerbated and prevent overall improvement and discharge  6/9/2025 2333 by Andria Lam, RN  Outcome: Progressing  Flowsheets (Taken 6/9/2025 2000)  Care Plan - Patient's Chronic Conditions and Co-Morbidity Symptoms are Monitored and Maintained or Improved:   Monitor and assess patient's chronic conditions and comorbid symptoms for stability, deterioration, or improvement   Collaborate with multidisciplinary team to address chronic and comorbid conditions and prevent exacerbation or deterioration   Update acute care plan with appropriate goals if chronic or comorbid symptoms are exacerbated and prevent overall improvement and discharge

## 2025-06-11 NOTE — DISCHARGE SUMMARY
Epilepsy Monitoring Unit Neurology Discharge Summary     Patient ID:  Valdemar Figueroa  [id number]  24 y.o.  2000    Admit Date: 6/9/2025    Discharge Date: 6/11/2025      Admission Diagnoses:     Discharge Diagnoses:     Disposition: home    Discharged Condition: good    Hospital Summary:  ***    Patient Active Problem List    Diagnosis Date Noted    Intractable seizures (HCC) 06/09/2025    ARF (acute renal failure) 09/15/2024    Status epilepticus (HCC) 09/12/2024    Seizures (HCC)     Asthma     Rhabdomyolysis 12/12/2020    Spasm of back muscles 12/12/2020    Generalized abdominal pain 12/12/2020     Past Medical History:   Diagnosis Date    Asthma     Bipolar disorder (HCC)     IBS (irritable bowel syndrome)     Left against medical advice 09/12/2024    Marijuana abuse     Narcolepsy     Seizures (HCC)     Vapes nicotine containing substance       No family history on file.   Social History     Tobacco Use    Smoking status: Never    Smokeless tobacco: Never   Substance Use Topics    Alcohol use: No     Past Surgical History:   Procedure Laterality Date    HEENT      Nasal septum surgery      Prior to Admission medications    Medication Sig Start Date End Date Taking? Authorizing Provider   divalproex (DEPAKOTE ER) 500 MG extended release tablet Take 2 tablets by mouth 2 times daily 6/11/25  Yes Mary Rosario MD   SEROQUEL XR 50 MG extended release tablet Take 1 tablet by mouth nightly 4/14/25  Yes Provider, MD Jordy   levETIRAcetam (KEPPRA) 1000 MG tablet Take 1 tablet by mouth 2 times daily 5/16/25 8/14/25 Yes Frieda Razo MD   sertraline (ZOLOFT) 50 MG tablet Take 1 tablet by mouth daily 5/22/24  Yes Provider, MD Jordy     Allergies   Allergen Reactions    Vicks Vaporub [Camph-Eucalypt-Men-Turp-Pet]         Discharge Exam:  Exam:  BP (!) 100/51   Pulse 72   Temp 98.1 °F (36.7 °C) (Oral)   Resp 18   Wt 74.9 kg (165 lb 2 oz)   SpO2 98%   BMI 21.20 kg/m²   Gen:  CV: RRR  Lungs: non

## 2025-06-23 PROBLEM — G40.119: Status: ACTIVE | Noted: 2025-06-23

## 2025-08-19 ENCOUNTER — OFFICE VISIT (OUTPATIENT)
Age: 25
End: 2025-08-19
Payer: MEDICAID

## 2025-08-19 VITALS
HEIGHT: 74 IN | OXYGEN SATURATION: 99 % | DIASTOLIC BLOOD PRESSURE: 67 MMHG | TEMPERATURE: 97.3 F | WEIGHT: 170 LBS | SYSTOLIC BLOOD PRESSURE: 102 MMHG | BODY MASS INDEX: 21.82 KG/M2 | HEART RATE: 61 BPM

## 2025-08-19 DIAGNOSIS — G40.109 LOCALIZATION-RELATED EPILEPSY (HCC): Primary | ICD-10-CM

## 2025-08-19 PROCEDURE — 99215 OFFICE O/P EST HI 40 MIN: CPT | Performed by: STUDENT IN AN ORGANIZED HEALTH CARE EDUCATION/TRAINING PROGRAM

## 2025-08-19 RX ORDER — LEVETIRACETAM 1000 MG/1
1000 TABLET ORAL 2 TIMES DAILY
Qty: 60 TABLET | Refills: 0 | Status: SHIPPED | OUTPATIENT
Start: 2025-08-19 | End: 2025-11-17

## 2025-08-19 RX ORDER — DIVALPROEX SODIUM 500 MG/1
1000 TABLET, FILM COATED, EXTENDED RELEASE ORAL 2 TIMES DAILY
Qty: 120 TABLET | Refills: 3 | Status: SHIPPED | OUTPATIENT
Start: 2025-08-19

## 2025-08-19 RX ORDER — DIAZEPAM 7.5 MG/100UL
15 SPRAY NASAL PRN
Qty: 1 EACH | Refills: 3 | Status: SHIPPED | OUTPATIENT
Start: 2025-08-19

## 2025-08-19 RX ORDER — LEVETIRACETAM 1000 MG/1
1000 TABLET ORAL 2 TIMES DAILY
Qty: 60 TABLET | Refills: 0 | Status: SHIPPED | OUTPATIENT
Start: 2025-08-19 | End: 2025-08-19

## 2025-08-19 ASSESSMENT — ENCOUNTER SYMPTOMS
COUGH: 0
VOMITING: 0
SHORTNESS OF BREATH: 0
NAUSEA: 0
BACK PAIN: 1

## 2025-08-20 ENCOUNTER — CLINICAL DOCUMENTATION (OUTPATIENT)
Age: 25
End: 2025-08-20